# Patient Record
(demographics unavailable — no encounter records)

---

## 2024-12-26 NOTE — HISTORY OF PRESENT ILLNESS
Hygiene:Bathe normally starting tomorrow  Diet:regular  Pain: OTC tylenol and motrin- may alternate q 3 hours as needed  Activity: No Ball sports x 2 weeks and Normal activity as tolerated.  Have your  Child rest today, supervise and assist  All, light activity     [de-identified] : Last Visit: Jul 26 2024 Reason: left knee pain Symptoms: swelling and throbbing  Pain Level: 8/10 Physical therapy/ Home exercises:

## 2024-12-26 NOTE — PROCEDURE

## 2024-12-26 NOTE — PHYSICAL EXAM
[de-identified] : Bilateral knee\par  \par  Constitutional: \par  The patient is healthy-appearing and in no apparent distress. \par  \par  Gait:\par  The patient ambulates with a normal gait and no limp.\par  \par  Cardiovascular System: \par  The capillary refill is less than 2 seconds. \par  \par  Skin: \par  There are no skin abnormalities.\par  \par  Right Knee:\par   \par  Bony Palpation: \par  There is no tenderness of the medial joint line. \par  There is no tenderness of the lateral joint line.\par  There is no tenderness of the medial femoral chondyle.\par  There is no tenderness of the lateral femoral chondyle.\par  There is no tenderness of the tibial tubercle.\par  There is no tenderness of the superior patella.\par  There is no tenderness of the inferior patella.\par  There is no tenderness of the medial patellar facet.\par  There is no tenderness of the lateral patellar facet.\par  \par  Soft Tissue Palpation: \par  There is no tenderness of the medial retinaculum.\par  There is no tenderness of the lateral retinaculum.\par  There is no tenderness of the quadriceps tendon.\par  There is no tenderness of the patella tendon.\par  There is no tenderness of the ITB.\par  There is no tenderness of the pes anserine.\par  \par  Active Range of Motion: \par  The range of motion at the knee actively and passively is full. \par  \par  Special Tests: \par  There is a negative Apley.\par  There is a negative Steinmanns. \par  There is a negative Lachman and Anterior Drawer.\par  There is a negative Posterior Drawer.  \par  There is no varus or valgus laxity.\par  Strength: \par  There is 5/5 hip flexion and 5/5 knee flexion and extension.  \par  \par  Left Knee:\par   \par  Bony Palpation: \par  There is no tenderness of the medial joint line. \par  There is no tenderness of the lateral joint line.\par  There is tenderness of the medial femoral chondyle.\par  There is tenderness of the lateral femoral chondyle.\par  There is no tenderness of the tibial tubercle.\par  There is no tenderness of the superior patella.\par  There is no tenderness of the inferior patella.\par  There is tenderness of the medial patellar facet.\par  There is tenderness of the lateral patellar facet.\par  \par  Soft Tissue Palpation: \par  There is no tenderness of the medial retinaculum.\par  There is no tenderness of the lateral retinaculum.\par  There is no tenderness of the quadriceps tendon.\par  There is no tenderness of the patella tendon.\par  There is no tenderness of the ITB.\par  There is no tenderness of the pes anserine.\par  \par  Active Range of Motion: \par  The range of motion at the knee actively and passively is full. \par  \par  Special Tests: \par  There is a negative Apley.\par  There is a negative Steinmanns. \par  There is a negative Lachman and Anterior Drawer.\par  There is a negative Posterior Drawer.  \par  There is no varus or valgus laxity.\par  \par  Strength: \par  There is 5/5 hip flexion and 5/5 knee flexion and extension.  \par  \par  Psychiatric: \par  The patient demonstrates a normal mood and affect and is active and alert\par  \par

## 2024-12-26 NOTE — ASSESSMENT
[FreeTextEntry1] : Discussed at length with patient and her daughter underlying arthritis she elects cortisone injection anti-inflammatory and home exercises she will follow-up on an as-needed basis

## 2025-05-01 NOTE — ASSESSMENT
[FreeTextEntry1] : Discussed at length with patient and the daughter underlying arthritis and a cortisone injection of the left knee at this time as well as would like to repeat viscosupplementation

## 2025-05-01 NOTE — HISTORY OF PRESENT ILLNESS
[de-identified] : Last Visit: Dec 26 2024  Reason: left knee pain/. pt taking motrin  Pain level: 10/10 Symptoms: throbbing ad stabbing  Physical therapy/Home exercises: n/a Patient is present with her daughter for history and exam

## 2025-05-01 NOTE — PROCEDURE

## 2025-06-11 NOTE — PROCEDURE
[de-identified] : After discussion of the risks and benefits, the patient elects Orthovisc injection to the knee.  Confirmed that the patient does not have history of prior adverse reactions, active infections, or relevant allergies.  There was no effusion, erythema, or warmth, and the skin was clear. The skin was prepped in the usual sterile manner, ethyl chloride spray was used as a topical anesthetic.  A needle was inserted  UTILIZING ULTRASOUND GUIDANCE TO LOCALIZE THE NEEDLE IN THE KNEE JOINT into the LEFT KNEE via an anterolateral approach.  Viscosupplement was then slowly injected, The injection was completed without complication and a bandage was applied. The patient tolerated the procedure well and was given post-injection instructions: no exercise x 48 hours, cold packs and analgesics prn.

## 2025-06-11 NOTE — ASSESSMENT
[FreeTextEntry1] : Discussed at length with patient and her daughter exam history and imaging and prior treatment options and at this time she elects viscosupplementation to the left knee only

## 2025-06-11 NOTE — HISTORY OF PRESENT ILLNESS
[de-identified] : Last visit: May  1 2025  Reason:left knee pain Symptoms: throbbing and stabbing 3 days ago  Pain level: 1/10 Physical therapy/ Home exercises n/a

## 2025-06-25 NOTE — PROCEDURE
[de-identified] : After discussion of the risks and benefits, the patient elects Orthovisc injection to the knee.  Confirmed that the patient does not have history of prior adverse reactions, active infections, or relevant allergies.  There was no effusion, erythema, or warmth, and the skin was clear. The skin was prepped in the usual sterile manner, ethyl chloride spray was used as a topical anesthetic.  A needle was inserted  UTILIZING ULTRASOUND GUIDANCE TO LOCALIZE THE NEEDLE IN THE KNEE JOINT into the LEFT KNEE via an anterolateral approach.  Viscosupplement was then slowly injected, The injection was completed without complication and a bandage was applied. The patient tolerated the procedure well and was given post-injection instructions: no exercise x 48 hours, cold packs and analgesics prn.

## 2025-06-25 NOTE — HISTORY OF PRESENT ILLNESS
[de-identified] : Patient is an established patient presenting for follow-up evaluation regards to left knee pain and arthritis discussed treatment options

## 2025-06-25 NOTE — PHYSICAL EXAM
[de-identified] : Bilateral knee\par  \par  Constitutional: \par  The patient is healthy-appearing and in no apparent distress. \par  \par  Gait:\par  The patient ambulates with a normal gait and no limp.\par  \par  Cardiovascular System: \par  The capillary refill is less than 2 seconds. \par  \par  Skin: \par  There are no skin abnormalities.\par  \par  Right Knee:\par   \par  Bony Palpation: \par  There is no tenderness of the medial joint line. \par  There is no tenderness of the lateral joint line.\par  There is no tenderness of the medial femoral chondyle.\par  There is no tenderness of the lateral femoral chondyle.\par  There is no tenderness of the tibial tubercle.\par  There is no tenderness of the superior patella.\par  There is no tenderness of the inferior patella.\par  There is no tenderness of the medial patellar facet.\par  There is no tenderness of the lateral patellar facet.\par  \par  Soft Tissue Palpation: \par  There is no tenderness of the medial retinaculum.\par  There is no tenderness of the lateral retinaculum.\par  There is no tenderness of the quadriceps tendon.\par  There is no tenderness of the patella tendon.\par  There is no tenderness of the ITB.\par  There is no tenderness of the pes anserine.\par  \par  Active Range of Motion: \par  The range of motion at the knee actively and passively is full. \par  \par  Special Tests: \par  There is a negative Apley.\par  There is a negative Steinmanns. \par  There is a negative Lachman and Anterior Drawer.\par  There is a negative Posterior Drawer.  \par  There is no varus or valgus laxity.\par  Strength: \par  There is 5/5 hip flexion and 5/5 knee flexion and extension.  \par  \par  Left Knee:\par   \par  Bony Palpation: \par  There is no tenderness of the medial joint line. \par  There is no tenderness of the lateral joint line.\par  There is tenderness of the medial femoral chondyle.\par  There is tenderness of the lateral femoral chondyle.\par  There is no tenderness of the tibial tubercle.\par  There is no tenderness of the superior patella.\par  There is no tenderness of the inferior patella.\par  There is tenderness of the medial patellar facet.\par  There is tenderness of the lateral patellar facet.\par  \par  Soft Tissue Palpation: \par  There is no tenderness of the medial retinaculum.\par  There is no tenderness of the lateral retinaculum.\par  There is no tenderness of the quadriceps tendon.\par  There is no tenderness of the patella tendon.\par  There is no tenderness of the ITB.\par  There is no tenderness of the pes anserine.\par  \par  Active Range of Motion: \par  The range of motion at the knee actively and passively is full. \par  \par  Special Tests: \par  There is a negative Apley.\par  There is a negative Steinmanns. \par  There is a negative Lachman and Anterior Drawer.\par  There is a negative Posterior Drawer.  \par  There is no varus or valgus laxity.\par  \par  Strength: \par  There is 5/5 hip flexion and 5/5 knee flexion and extension.  \par  \par  Psychiatric: \par  The patient demonstrates a normal mood and affect and is active and alert\par  \par